# Patient Record
Sex: MALE | Race: BLACK OR AFRICAN AMERICAN | URBAN - METROPOLITAN AREA
[De-identification: names, ages, dates, MRNs, and addresses within clinical notes are randomized per-mention and may not be internally consistent; named-entity substitution may affect disease eponyms.]

---

## 2021-05-19 ENCOUNTER — OFFICE VISIT (OUTPATIENT)
Dept: URBAN - METROPOLITAN AREA CLINIC 25 | Facility: CLINIC | Age: 56
End: 2021-05-19
Payer: COMMERCIAL

## 2021-05-19 VITALS
HEART RATE: 81 BPM | BODY MASS INDEX: 31.89 KG/M2 | TEMPERATURE: 97.9 F | SYSTOLIC BLOOD PRESSURE: 155 MMHG | DIASTOLIC BLOOD PRESSURE: 95 MMHG | WEIGHT: 235.4 LBS | HEIGHT: 72 IN

## 2021-05-19 DIAGNOSIS — R63.4 ABNORMAL WEIGHT LOSS: ICD-10-CM

## 2021-05-19 DIAGNOSIS — K57.32 DIVERTICULITIS OF LARGE INTESTINE WITHOUT PERFORATION OR ABSCESS WITHOUT BLEEDING: ICD-10-CM

## 2021-05-19 DIAGNOSIS — R10.32 LEFT LOWER QUADRANT ABDOMINAL PAIN: ICD-10-CM

## 2021-05-19 PROBLEM — 111359004 DIVERTICULITIS OF COLON: Status: ACTIVE | Noted: 2021-05-19

## 2021-05-19 PROCEDURE — 99204 OFFICE O/P NEW MOD 45 MIN: CPT | Performed by: INTERNAL MEDICINE

## 2021-05-19 RX ORDER — TELMISARTAN 40 MG/1
1 TABLET TABLET ORAL ONCE A DAY
Status: ACTIVE | COMMUNITY

## 2021-05-19 NOTE — HPI-TODAY'S VISIT:
May 2021: No prior colonoscopy. Hx diverticulitis early May 2021 - acute onset lower abdominal pain. Urine was also dark. Was referred to urologist. CT was done at [facility] without iv contrast - was told he had diverticulitis. Was started on antibiotics ( still taking ). Pain is improved.  Was told he had divertiiculosis since 2 yrs but no diverticulitis.   Afraid to be put to sleep.  Cardiologist @ [facility]. Dr Mulu Oreilly  No family history of colon cancer / GI malignancies / IBD  No Heartburn, Dysphagia, Melena, Rectal bleeding, Diarrhea, Constipation, Abdominal pain.  Losing weight. 20 lbs weight loss since dec 2020.   Saw oncology Dr. Abel ( [facility] ). Was told everything was ok except low wbc.

## 2021-08-28 ENCOUNTER — TELEPHONE ENCOUNTER (OUTPATIENT)
Dept: URBAN - METROPOLITAN AREA CLINIC 13 | Facility: CLINIC | Age: 56
End: 2021-08-28

## 2021-08-29 ENCOUNTER — TELEPHONE ENCOUNTER (OUTPATIENT)
Dept: URBAN - METROPOLITAN AREA CLINIC 13 | Facility: CLINIC | Age: 56
End: 2021-08-29

## 2023-03-28 ENCOUNTER — P2P PATIENT RECORD (OUTPATIENT)
Age: 58
End: 2023-03-28

## 2023-04-06 ENCOUNTER — OFFICE VISIT (OUTPATIENT)
Dept: URBAN - METROPOLITAN AREA CLINIC 74 | Facility: CLINIC | Age: 58
End: 2023-04-06
Payer: COMMERCIAL

## 2023-04-06 VITALS — HEIGHT: 72 IN

## 2023-04-06 DIAGNOSIS — R10.12 LUQ PAIN: ICD-10-CM

## 2023-04-06 DIAGNOSIS — R63.4 ABNORMAL WEIGHT LOSS: ICD-10-CM

## 2023-04-06 DIAGNOSIS — R10.32 LEFT LOWER QUADRANT ABDOMINAL PAIN: ICD-10-CM

## 2023-04-06 DIAGNOSIS — K57.32 DIVERTICULITIS OF LARGE INTESTINE WITHOUT PERFORATION OR ABSCESS WITHOUT BLEEDING: ICD-10-CM

## 2023-04-06 PROCEDURE — 99213 OFFICE O/P EST LOW 20 MIN: CPT | Performed by: INTERNAL MEDICINE

## 2023-04-06 RX ORDER — PANTOPRAZOLE SODIUM 40 MG/1
TABLET, DELAYED RELEASE ORAL
Qty: 30 TABLET | Status: ACTIVE | COMMUNITY

## 2023-04-06 RX ORDER — TAMSULOSIN HYDROCHLORIDE 0.4 MG/1
CAPSULE ORAL
Qty: 90 CAPSULE | Status: ACTIVE | COMMUNITY

## 2023-04-06 RX ORDER — TELMISARTAN 40 MG/1
1 TABLET TABLET ORAL ONCE A DAY
Status: ACTIVE | COMMUNITY

## 2023-04-06 NOTE — HPI-TODAY'S VISIT:
58 yo AAM here for heme-occult positive stool. Hx diverticulitis in early May 2021. Afraid to be put to sleep.  Cardiologist @ [facility]. Dr Mulu Oreilly  No family history of colon cancer / GI malignancies / IBD  No Heartburn, Dysphagia, Melena, Rectal bleeding, Diarrhea, Constipation, Abdominal pain.  Saw oncology Dr. Abel ( [facility] ). Was told everything was ok except low WBC. lost 27 pounds since Jan likely from change in diet/pain. protonix is helping pain. afraid of anesthesia, thus does not want colonoscopy.

## 2023-04-11 ENCOUNTER — TELEPHONE ENCOUNTER (OUTPATIENT)
Dept: URBAN - METROPOLITAN AREA CLINIC 74 | Facility: CLINIC | Age: 58
End: 2023-04-11

## 2023-04-11 ENCOUNTER — OFFICE VISIT (OUTPATIENT)
Dept: URBAN - METROPOLITAN AREA CLINIC 73 | Facility: CLINIC | Age: 58
End: 2023-04-11
Payer: COMMERCIAL

## 2023-04-11 DIAGNOSIS — K62.5 RECTAL BLEEDING: ICD-10-CM

## 2023-04-11 PROCEDURE — 82274 ASSAY TEST FOR BLOOD FECAL: CPT | Performed by: INTERNAL MEDICINE

## 2024-01-22 ENCOUNTER — P2P PATIENT RECORD (OUTPATIENT)
Age: 59
End: 2024-01-22

## 2024-01-31 ENCOUNTER — DASHBOARD ENCOUNTERS (OUTPATIENT)
Age: 59
End: 2024-01-31

## 2024-02-05 ENCOUNTER — OV EP (OUTPATIENT)
Dept: URBAN - METROPOLITAN AREA CLINIC 88 | Facility: CLINIC | Age: 59
End: 2024-02-05

## 2024-02-05 RX ORDER — TELMISARTAN 40 MG/1
1 TABLET TABLET ORAL ONCE A DAY
Status: ACTIVE | COMMUNITY

## 2024-02-05 RX ORDER — PANTOPRAZOLE SODIUM 40 MG/1
TABLET, DELAYED RELEASE ORAL
Qty: 30 TABLET | Status: ACTIVE | COMMUNITY

## 2024-02-05 RX ORDER — TAMSULOSIN HYDROCHLORIDE 0.4 MG/1
CAPSULE ORAL
Qty: 90 CAPSULE | Status: ACTIVE | COMMUNITY

## 2024-02-05 NOTE — HPI-OTHER HISTORIES
------------------------------------------------------------- Last office note by Dr. Gena Hankins on 04/06/2023: 56 yo AAM here for heme-occult positive stool. Hx diverticulitis in early May 2021. Afraid to be put to sleep.  Cardiologist @ [facility]. Dr Mulu Oreilly  No family history of colon cancer / GI malignancies / IBD  No Heartburn, Dysphagia, Melena, Rectal bleeding, Diarrhea, Constipation, Abdominal pain.  Saw oncology Dr. Abel ( [facility] ). Was told everything was ok except low WBC. lost 27 pounds since Jan likely from change in diet/pain. protonix is helping pain. afraid of anesthesia, thus does not want colonoscopy.

## 2024-02-05 NOTE — HPI-TODAY'S VISIT:
58 y.o. referred by Shellie Smith NP for evaluation of hepatitis B.  A copy of this note will be sent to the referring provider.  Labs on 01/16/2024:  positive HBcAb, positive HBsAb, negative HBsAg, negative HCV Ab, Total bilirubin 0.2, AST 22, ALT 22, ALP 80, Hgb 15.1, .

## 2024-07-26 ENCOUNTER — TELEPHONE ENCOUNTER (OUTPATIENT)
Dept: URBAN - METROPOLITAN AREA CLINIC 70 | Facility: CLINIC | Age: 59
End: 2024-07-26

## 2024-07-26 ENCOUNTER — OFFICE VISIT (OUTPATIENT)
Dept: URBAN - METROPOLITAN AREA CLINIC 88 | Facility: CLINIC | Age: 59
End: 2024-07-26
Payer: COMMERCIAL

## 2024-07-26 VITALS
OXYGEN SATURATION: 99 % | DIASTOLIC BLOOD PRESSURE: 85 MMHG | BODY MASS INDEX: 34.89 KG/M2 | TEMPERATURE: 97.2 F | HEART RATE: 102 BPM | HEIGHT: 72 IN | WEIGHT: 257.6 LBS | SYSTOLIC BLOOD PRESSURE: 137 MMHG

## 2024-07-26 DIAGNOSIS — Z86.19 HISTORY OF HEPATITIS B: ICD-10-CM

## 2024-07-26 DIAGNOSIS — K63.8219 SMALL INTESTINAL BACTERIAL OVERGROWTH (SIBO): ICD-10-CM

## 2024-07-26 PROCEDURE — 99213 OFFICE O/P EST LOW 20 MIN: CPT | Performed by: NURSE PRACTITIONER

## 2024-07-26 RX ORDER — TELMISARTAN 40 MG/1
1 TABLET TABLET ORAL ONCE A DAY
Status: DISCONTINUED | COMMUNITY

## 2024-07-26 RX ORDER — RIFAXIMIN 550 MG/1
1 TABLET TABLET ORAL THREE TIMES A DAY
Qty: 42 TABLET | Refills: 1
Start: 2024-07-26 | End: 2024-08-23

## 2024-07-26 RX ORDER — PANTOPRAZOLE SODIUM 40 MG/1
TABLET, DELAYED RELEASE ORAL
Qty: 30 TABLET | Status: DISCONTINUED | COMMUNITY

## 2024-07-26 RX ORDER — TAMSULOSIN HYDROCHLORIDE 0.4 MG/1
CAPSULE ORAL
Qty: 90 CAPSULE | Status: ACTIVE | COMMUNITY

## 2024-07-26 RX ORDER — RIFAXIMIN 550 MG/1
1 TABLET TABLET ORAL THREE TIMES A DAY
Qty: 42 TABLET | Refills: 1 | OUTPATIENT
Start: 2024-07-26 | End: 2024-08-23

## 2024-07-26 NOTE — HPI-OTHER HISTORIES
------------------------------------------------------------- Last office note by Dr. Gena Hankins on 04/06/2023: 58 yo AAM here for heme-occult positive stool. Hx diverticulitis in early May 2021. Afraid to be put to sleep.  Cardiologist @ [facility]. Dr Mulu Oreilly  No family history of colon cancer / GI malignancies / IBD  No Heartburn, Dysphagia, Melena, Rectal bleeding, Diarrhea, Constipation, Abdominal pain.  Saw oncology Dr. Abel ( [facility] ). Was told everything was ok except low WBC. lost 27 pounds since Jan likely from change in diet/pain. protonix is helping pain. afraid of anesthesia, thus does not want colonoscopy.

## 2024-07-26 NOTE — HPI-TODAY'S VISIT:
58 y.o. referred by Shellie Smith NP for evaluation of hepatitis B.  A copy of this note will be sent to the referring provider.  Labs on 01/16/2024:  positive HBcAb, positive HBsAb, negative HBsAg, negative HCV Ab, Total bilirubin 0.2, AST 22, ALT 22, ALP 80, Hgb 15.1, .  States was informed of having hepatitis B attempting to donate blood in 2006.  Was informed by PCP of hepatitis B being not active over 4 months ago.  Also voices normal imaging of the liver over 6 months ago.  Denies signs of jaundice or undergoing prior treatment for hepatitis B.  Voices normal celiac and h. pylori testing a month.    Over the last 8 weeks, reports increase in flatulence with foul odor.  Describes his diet as being "clean" with no significant dietary or lifestyle changes voiced.  Denies abdominal pain, symptoms of reflux.  States similar complaints occurred in 2015.  Was prescribed Xifaxan 200 mg BID x 5 with symptoms completely resolving.  Requesting refill of medication at this time.    Voices normal Cologuard testing about 6 months ago.  Voices EGD in early 2000s and was informed of having hiatal hernia.

## 2024-07-29 ENCOUNTER — TELEPHONE ENCOUNTER (OUTPATIENT)
Dept: URBAN - METROPOLITAN AREA CLINIC 27 | Facility: CLINIC | Age: 59
End: 2024-07-29

## 2024-07-29 ENCOUNTER — TELEPHONE ENCOUNTER (OUTPATIENT)
Dept: URBAN - METROPOLITAN AREA CLINIC 88 | Facility: CLINIC | Age: 59
End: 2024-07-29

## 2024-08-05 ENCOUNTER — TELEPHONE ENCOUNTER (OUTPATIENT)
Dept: URBAN - METROPOLITAN AREA CLINIC 70 | Facility: CLINIC | Age: 59
End: 2024-08-05

## 2024-08-05 RX ORDER — SULFAMETHOXAZOLE AND TRIMETHOPRIM 800; 160 MG/1; MG/1
1 TABLET TABLET ORAL THREE TIMES A DAY
Qty: 42 TABLET | Refills: 0 | OUTPATIENT
Start: 2024-08-05 | End: 2024-08-19

## 2024-08-05 RX ORDER — RIFAXIMIN 550 MG/1
1 TABLET TABLET ORAL THREE TIMES A DAY
Qty: 42 TABLET | Refills: 1
Start: 2024-07-26 | End: 2024-09-02

## 2024-08-06 ENCOUNTER — TELEPHONE ENCOUNTER (OUTPATIENT)
Dept: URBAN - METROPOLITAN AREA CLINIC 88 | Facility: CLINIC | Age: 59
End: 2024-08-06

## 2024-08-30 ENCOUNTER — OFFICE VISIT (OUTPATIENT)
Dept: URBAN - METROPOLITAN AREA CLINIC 88 | Facility: CLINIC | Age: 59
End: 2024-08-30